# Patient Record
(demographics unavailable — no encounter records)

---

## 2025-05-06 NOTE — HISTORY OF PRESENT ILLNESS
[FreeTextEntry1] : L leg wound [de-identified] : sustained injury to L leg medial lower aspect after sustained after came into contact with piece wood while swimming [employer] ONE WEEK AGO went to Urgent  CARE  4  DAYS AGO and treated with doxycycline/ mupirocin culture results are pending not improving as much as patient would like

## 2025-05-06 NOTE — PLAN
[FreeTextEntry1] : Augmentin ordered does not wish to remain on doxycycline declined visiting nurse RTO on week change DSG twice daily keep leg elevated wishes to get complete exam when leg is better

## 2025-05-06 NOTE — REVIEW OF SYSTEMS
[Fever] : no fever [Earache] : no earache [Chest Pain] : no chest pain [Shortness Of Breath] : no shortness of breath [Abdominal Pain] : no abdominal pain [Itching] : no itching [Headache] : no headache [Anxiety] : no anxiety

## 2025-05-06 NOTE — PHYSICAL EXAM
[No JVD] : no jugular venous distention [No Respiratory Distress] : no respiratory distress  [Normal Rate] : normal rate  [No Edema] : there was no peripheral edema [Declined Breast Exam] : declined breast exam  [Soft] : abdomen soft [Non Tender] : non-tender [Normal Supraclavicular Nodes] : no supraclavicular lymphadenopathy [No CVA Tenderness] : no CVA  tenderness [No Joint Swelling] : no joint swelling [No Focal Deficits] : no focal deficits [Alert and Oriented x3] : oriented to person, place, and time [Normal Gait] : normal gait [de-identified] : erythema surrounding superficial lac medial aspect LLE

## 2025-05-06 NOTE — HEALTH RISK ASSESSMENT
[Good] : ~his/her~  mood as  good [Intercurrent Urgi Care visits] : went to urgent care [Yes] : Yes [Monthly or less (1 pt)] : Monthly or less (1 point) [1 or 2 (0 pts)] : 1 or 2 (0 points) [Never (0 pts)] : Never (0 points) [No] : In the past 12 months have you used drugs other than those required for medical reasons? No [No falls in past year] : Patient reported no falls in the past year [0] : 1) Little interest or pleasure doing things: Not at all (0) [1] : 2) Feeling down, depressed, or hopeless for several days (1) [With Significant Other] : lives with significant other [Retired] : retired [] :  [Fully functional (bathing, dressing, toileting, transferring, walking, feeding)] : Fully functional (bathing, dressing, toileting, transferring, walking, feeding) [Fully functional (using the telephone, shopping, preparing meals, housekeeping, doing laundry, using] : Fully functional and needs no help or supervision to perform IADLs (using the telephone, shopping, preparing meals, housekeeping, doing laundry, using transportation, managing medications and managing finances) [Reports changes in vision] : Reports changes in vision [Smoke Detector] : smoke detector [Carbon Monoxide Detector] : carbon monoxide detector [Safety elements used in home] : safety elements used in home [Seat Belt] :  uses seat belt [Sunscreen] : uses sunscreen [Never] : Never [FreeTextEntry1] : tiredness [de-identified] : [place] [Audit-CScore] : 1 [de-identified] : swimming [de-identified] : healthy [[id number]] : 0 1 [Reports changes in hearing] : Reports no changes in hearing [Reports changes in dental health] : Reports no changes in dental health

## 2025-05-06 NOTE — ASSESSMENT
[Vaccines Reviewed] : Immunizations reviewed today. Please see immunization details in the vaccine log within the immunization flowsheet.  [FreeTextEntry1] : will check from Urgent Care routine labs done

## 2025-05-11 NOTE — PHYSICAL EXAM
[No Acute Distress] : no acute distress [Clear to Auscultation] : lungs were clear to auscultation bilaterally [Normal Rate] : normal rate  [No Edema] : there was no peripheral edema [Soft] : abdomen soft [Non Tender] : non-tender [Normal Supraclavicular Nodes] : no supraclavicular lymphadenopathy [No CVA Tenderness] : no CVA  tenderness [Normal Gait] : normal gait [Alert and Oriented x3] : oriented to person, place, and time [de-identified] : no active drainage modest decrease erythema surrounding leg wound

## 2025-05-11 NOTE — PLAN
[FreeTextEntry1] : will refer to wound center at [facility] Stream number given referral given to keep leg elevated

## 2025-05-11 NOTE — PHYSICAL EXAM
[No Acute Distress] : no acute distress [Clear to Auscultation] : lungs were clear to auscultation bilaterally [Normal Rate] : normal rate  [No Edema] : there was no peripheral edema [Soft] : abdomen soft [Non Tender] : non-tender [Normal Supraclavicular Nodes] : no supraclavicular lymphadenopathy [No CVA Tenderness] : no CVA  tenderness [Normal Gait] : normal gait [Alert and Oriented x3] : oriented to person, place, and time [de-identified] : no active drainage modest decrease erythema surrounding leg wound

## 2025-05-11 NOTE — PHYSICAL EXAM
[No Acute Distress] : no acute distress [Clear to Auscultation] : lungs were clear to auscultation bilaterally [Normal Rate] : normal rate  [No Edema] : there was no peripheral edema [Soft] : abdomen soft [Non Tender] : non-tender [Normal Supraclavicular Nodes] : no supraclavicular lymphadenopathy [No CVA Tenderness] : no CVA  tenderness [Normal Gait] : normal gait [Alert and Oriented x3] : oriented to person, place, and time [de-identified] : no active drainage modest decrease erythema surrounding leg wound

## 2025-05-11 NOTE — PLAN
[FreeTextEntry1] : will refer to wound center at [facility] Stream number given referral given to keep leg elevated

## 2025-05-11 NOTE — HEALTH RISK ASSESSMENT
[No] : In the past 12 months have you used drugs other than those required for medical reasons? No [No falls in past year] : Patient reported no falls in the past year [0] : 2) Feeling down, depressed, or hopeless: Not at all (0) [Never] : Never [Audit-CScore] : 0 [de-identified] : walking [de-identified] : healthy [[id number]] : 0

## 2025-05-11 NOTE — PLAN
[FreeTextEntry1] : will refer to wound center at [facility] Stream number given referral given to keep leg elevated

## 2025-05-11 NOTE — HEALTH RISK ASSESSMENT
[No] : In the past 12 months have you used drugs other than those required for medical reasons? No [No falls in past year] : Patient reported no falls in the past year [0] : 2) Feeling down, depressed, or hopeless: Not at all (0) [Never] : Never [Audit-CScore] : 0 [de-identified] : walking [de-identified] : healthy [[id number]] : 0

## 2025-05-11 NOTE — HEALTH RISK ASSESSMENT
[No] : In the past 12 months have you used drugs other than those required for medical reasons? No [No falls in past year] : Patient reported no falls in the past year [0] : 2) Feeling down, depressed, or hopeless: Not at all (0) [Never] : Never [Audit-CScore] : 0 [de-identified] : walking [de-identified] : healthy [[id number]] : 0

## 2025-05-11 NOTE — HISTORY OF PRESENT ILLNESS
[FreeTextEntry8] : taking augmentin as noted slight improvement in surrounding wound erytema  culture from Urgent Care grew staph sensitive to augmentin

## 2025-05-12 NOTE — HISTORY OF PRESENT ILLNESS
[de-identified] : 67 yo female with history of left hip arthroplasty presenting to me two weeks after a fall with left distal posterior calf wound which is slow to heal. Cultures showed staph sensitive to her current regimen of Augmentin. Denies fevers/chills, states pain has steadily improved over the week and the erythema is less pronounced. Denies pain in foot distal to wound. States last time saw pus was 4 days ago. Has appointment with wound care center later this week. AVSS Bilateral 2+ lower extremity edema up to mid shin, states is chronic. Left posterior calf with resolving erythema. Non tender. Has two punctate wounds and two [id number] wounds that are 1.5cm deep. The more distal 2cm wound has an edge with necrosis which was debrided to viable tissue. No purulence encountered. After debridement, cleaned with betadine and dressed with telfa and tape. Patient tolerated well.  I reassured Ms. Alberto that it appears that her erythema is improving and that, given the severe swelling she has in bilateral lower extremities, I would expect this healing to take longer than it might otherwise. I encouraged her to keep the wound care appointment and to continue the antibiotic regimen in the meantime. I have instructed her to wash the wound with woap and water twice daily and to pat dry after. All questions answered to Ms. Alberto's satisfaction. She will follow up with me in 2 weeks.

## 2025-05-17 NOTE — PHYSICAL EXAM
[No Acute Distress] : no acute distress [Clear to Auscultation] : lungs were clear to auscultation bilaterally [Normal Rate] : normal rate  [No Edema] : there was no peripheral edema [Soft] : abdomen soft [Non Tender] : non-tender [Normal Supraclavicular Nodes] : no supraclavicular lymphadenopathy [No CVA Tenderness] : no CVA  tenderness [Normal Gait] : normal gait [Alert and Oriented x3] : oriented to person, place, and time [de-identified] : no active drainage modest decrease erythema surrounding leg wound

## 2025-05-17 NOTE — HEALTH RISK ASSESSMENT
[Yes] : Yes [Monthly or less (1 pt)] : Monthly or less (1 point) [1 or 2 (0 pts)] : 1 or 2 (0 points) [Never (0 pts)] : Never (0 points) [No] : In the past 12 months have you used drugs other than those required for medical reasons? 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

## 2025-05-17 NOTE — HISTORY OF PRESENT ILLNESS
[FreeTextEntry1] : wound slowly improving [de-identified] : seen by Dr Rhodes for evaluation slow healing L lower extremity wound minor debridement done there continues to be erythema surrounding the area however this is somewhat better

## 2025-05-17 NOTE — ASSESSMENT
[FreeTextEntry1] : wound slowly improving was to have gone to wound center but upon arrival didn't like way it looked and walked out

## 2025-05-17 NOTE — HEALTH RISK ASSESSMENT
[Yes] : Yes [Monthly or less (1 pt)] : Monthly or less (1 point) [1 or 2 (0 pts)] : 1 or 2 (0 points) [Never (0 pts)] : Never (0 points) [No] : In the past 12 months have you used drugs other than those required for medical reasons? 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

## 2025-05-17 NOTE — REVIEW OF SYSTEMS
[Fever] : no fever [Earache] : no earache [Chest Pain] : no chest pain [Shortness Of Breath] : no shortness of breath [Abdominal Pain] : no abdominal pain [Itching] : no itching [Headache] : no headache [Anxiety] : no anxiety [de-identified] : mild erythema surrounding closing wound no active drainage

## 2025-05-17 NOTE — PHYSICAL EXAM
[No Acute Distress] : no acute distress [Clear to Auscultation] : lungs were clear to auscultation bilaterally [Normal Rate] : normal rate  [No Edema] : there was no peripheral edema [Soft] : abdomen soft [Non Tender] : non-tender [Normal Supraclavicular Nodes] : no supraclavicular lymphadenopathy [No CVA Tenderness] : no CVA  tenderness [Normal Gait] : normal gait [Alert and Oriented x3] : oriented to person, place, and time [de-identified] : no active drainage modest decrease erythema surrounding leg wound

## 2025-05-17 NOTE — REVIEW OF SYSTEMS
[Fever] : no fever [Earache] : no earache [Chest Pain] : no chest pain [Shortness Of Breath] : no shortness of breath [Abdominal Pain] : no abdominal pain [Itching] : no itching [Headache] : no headache [Anxiety] : no anxiety [de-identified] : mild erythema surrounding closing wound no active drainage

## 2025-05-17 NOTE — HISTORY OF PRESENT ILLNESS
[FreeTextEntry1] : wound slowly improving [de-identified] : seen by Dr Rhodes for evaluation slow healing L lower extremity wound minor debridement done there continues to be erythema surrounding the area however this is somewhat better

## 2025-06-11 NOTE — HEALTH RISK ASSESSMENT
[Intercurrent ED visits] : went to ED [Intercurrent hospitalizations] : was admitted to the hospital  [Never (0 pts)] : Never (0 points) [No] : In the past 12 months have you used drugs other than those required for medical reasons? No [No falls in past year] : Patient reported no falls in the past year [0] : 2) Feeling down, depressed, or hopeless: Not at all (0) [Never] : Never